# Patient Record
Sex: MALE | Race: BLACK OR AFRICAN AMERICAN | NOT HISPANIC OR LATINO | URBAN - METROPOLITAN AREA
[De-identification: names, ages, dates, MRNs, and addresses within clinical notes are randomized per-mention and may not be internally consistent; named-entity substitution may affect disease eponyms.]

---

## 2021-08-16 ENCOUNTER — OFFICE (OUTPATIENT)
Dept: URBAN - METROPOLITAN AREA CLINIC 19 | Facility: CLINIC | Age: 36
End: 2021-08-16

## 2021-08-16 VITALS
HEIGHT: 69 IN | WEIGHT: 315 LBS | HEART RATE: 73 BPM | OXYGEN SATURATION: 97 % | SYSTOLIC BLOOD PRESSURE: 120 MMHG | DIASTOLIC BLOOD PRESSURE: 73 MMHG

## 2021-08-16 DIAGNOSIS — R19.7 DIARRHEA, UNSPECIFIED: ICD-10-CM

## 2021-08-16 DIAGNOSIS — Z80.0 FAMILY HISTORY OF MALIGNANT NEOPLASM OF DIGESTIVE ORGANS: ICD-10-CM

## 2021-08-16 DIAGNOSIS — K62.5 HEMORRHAGE OF ANUS AND RECTUM: ICD-10-CM

## 2021-08-16 LAB
ALLERGEN PROFILE, BASIC FOOD: CLASS DESCRIPTION: (no result)
ALLERGEN PROFILE, BASIC FOOD: F002-IGE MILK: 0.16 KU/L (ref 0–?)
ALLERGEN PROFILE, BASIC FOOD: F004-IGE WHEAT: 0.43 KU/L
ALLERGEN PROFILE, BASIC FOOD: F008-IGE CORN: 0.56 KU/L
ALLERGEN PROFILE, BASIC FOOD: F013-IGE PEANUT: 1.24 KU/L
ALLERGEN PROFILE, BASIC FOOD: F014-IGE SOYBEAN: 0.48 KU/L
ALLERGEN PROFILE, BASIC FOOD: F026-IGE PORK: <0.1 KU/L
ALLERGEN PROFILE, BASIC FOOD: F027-IGE BEEF: <0.1 KU/L
ALLERGEN PROFILE, BASIC FOOD: F093-IGE CHOCOLATE/CACAO: <0.1 KU/L
ALLERGEN PROFILE, BASIC FOOD: F245-IGE EGG, WHOLE: <0.1 KU/L
ALLERGEN PROFILE, BASIC FOOD: FX02-IGE FOOD MIX (SEAFOODS): NEGATIVE
C-REACTIVE PROTEIN, QUANT: 2 MG/L (ref 0–10)
CBC, PLATELET, NO DIFFERENTIAL: HEMATOCRIT: 41.3 % (ref 37.5–51)
CBC, PLATELET, NO DIFFERENTIAL: HEMOGLOBIN: 13.3 G/DL (ref 13–17.7)
CBC, PLATELET, NO DIFFERENTIAL: MCH: 27.3 PG (ref 26.6–33)
CBC, PLATELET, NO DIFFERENTIAL: MCHC: 32.2 G/DL (ref 31.5–35.7)
CBC, PLATELET, NO DIFFERENTIAL: MCV: 85 FL (ref 79–97)
CBC, PLATELET, NO DIFFERENTIAL: PLATELETS: 315 X10E3/UL (ref 150–450)
CBC, PLATELET, NO DIFFERENTIAL: RBC: 4.88 X10E6/UL (ref 4.14–5.8)
CBC, PLATELET, NO DIFFERENTIAL: RDW: 14.6 % (ref 11.6–15.4)
CBC, PLATELET, NO DIFFERENTIAL: WBC: 8.1 X10E3/UL (ref 3.4–10.8)
COMP. METABOLIC PANEL (14): A/G RATIO: 1.6 (ref 1.2–2.2)
COMP. METABOLIC PANEL (14): ALBUMIN: 4.2 G/DL (ref 4–5)
COMP. METABOLIC PANEL (14): ALKALINE PHOSPHATASE: 87 IU/L (ref 48–121)
COMP. METABOLIC PANEL (14): ALT (SGPT): 18 IU/L (ref 0–44)
COMP. METABOLIC PANEL (14): AST (SGOT): 15 IU/L (ref 0–40)
COMP. METABOLIC PANEL (14): BILIRUBIN, TOTAL: <0.2 MG/DL
COMP. METABOLIC PANEL (14): BUN/CREATININE RATIO: 13 (ref 9–20)
COMP. METABOLIC PANEL (14): BUN: 13 MG/DL (ref 6–20)
COMP. METABOLIC PANEL (14): CALCIUM: 9.3 MG/DL (ref 8.7–10.2)
COMP. METABOLIC PANEL (14): CARBON DIOXIDE, TOTAL: 23 MMOL/L (ref 20–29)
COMP. METABOLIC PANEL (14): CHLORIDE: 105 MMOL/L (ref 96–106)
COMP. METABOLIC PANEL (14): CREATININE: 1 MG/DL (ref 0.76–1.27)
COMP. METABOLIC PANEL (14): EGFR IF AFRICN AM: 113 ML/MIN/1.73 (ref 59–?)
COMP. METABOLIC PANEL (14): EGFR IF NONAFRICN AM: 98 ML/MIN/1.73 (ref 59–?)
COMP. METABOLIC PANEL (14): GLOBULIN, TOTAL: 2.6 G/DL (ref 1.5–4.5)
COMP. METABOLIC PANEL (14): GLUCOSE: 116 MG/DL — HIGH (ref 65–99)
COMP. METABOLIC PANEL (14): POTASSIUM: 4.6 MMOL/L (ref 3.5–5.2)
COMP. METABOLIC PANEL (14): PROTEIN, TOTAL: 6.8 G/DL (ref 6–8.5)
COMP. METABOLIC PANEL (14): SODIUM: 142 MMOL/L (ref 134–144)
IMMUNOGLOBULIN A, QN, SERUM: 448 MG/DL — HIGH (ref 90–386)
SEDIMENTATION RATE-WESTERGREN: 32 MM/HR — HIGH (ref 0–15)
T-TRANSGLUTAMINASE (TTG) IGA: <2 U/ML
TSH: 1.51 UIU/ML (ref 0.45–4.5)

## 2021-08-16 PROCEDURE — 99204 OFFICE O/P NEW MOD 45 MIN: CPT

## 2021-08-16 RX ORDER — SODIUM PICOSULFATE, MAGNESIUM OXIDE, AND ANHYDROUS CITRIC ACID 10; 3.5; 12 MG/160ML; G/160ML; G/160ML
LIQUID ORAL
Qty: 1 | Refills: 0 | Status: COMPLETED
Start: 2021-08-16 | End: 2021-10-13

## 2021-08-16 RX ORDER — DICYCLOMINE HYDROCHLORIDE 10 MG/1
CAPSULE ORAL
Qty: 90 | Refills: 3 | Status: COMPLETED
Start: 2021-08-16 | End: 2021-10-13

## 2021-08-16 NOTE — SERVICEHPINOTES
36-year-old  black male here to discuss chronic postprandial diarrhea.  For the last few years he has had progressive issues with diarrhea, especially after he eats.  Sometimes it will be “watery” stool and sometimes it will be “mushy” stool.  He has occasional rectal bleeding that he attributes to hemorrhoids or “wiping too hard.”  He denies any upper GI issues, including reflux, heartburn, and dysphagia.  He does have some lower abdominal cramping that he attributes to his bowel movements.  It typically resolves after he has a bowel movement.  He takes no GI medications.  He denies any previous GI tests or studies.  Family history is negative for IBD.  His father had colon cancer at age 44.

## 2021-08-16 NOTE — SERVICENOTES
The patient's assessment was reviewed with Dr. Cabrales and a collaborative plan of care was established.

## 2021-10-13 ENCOUNTER — AMBULATORY SURGICAL CENTER (OUTPATIENT)
Dept: URBAN - METROPOLITAN AREA SURGERY 2 | Facility: SURGERY | Age: 36
End: 2021-10-13

## 2021-10-13 ENCOUNTER — OFFICE (OUTPATIENT)
Dept: URBAN - METROPOLITAN AREA PATHOLOGY 22 | Facility: PATHOLOGY | Age: 36
End: 2021-10-13

## 2021-10-13 VITALS
SYSTOLIC BLOOD PRESSURE: 122 MMHG | DIASTOLIC BLOOD PRESSURE: 95 MMHG | HEART RATE: 72 BPM | RESPIRATION RATE: 22 BRPM | RESPIRATION RATE: 18 BRPM | HEART RATE: 83 BPM | OXYGEN SATURATION: 93 % | WEIGHT: 315 LBS | TEMPERATURE: 97.3 F | HEART RATE: 69 BPM | DIASTOLIC BLOOD PRESSURE: 75 MMHG | OXYGEN SATURATION: 97 % | SYSTOLIC BLOOD PRESSURE: 119 MMHG | RESPIRATION RATE: 18 BRPM | RESPIRATION RATE: 22 BRPM | DIASTOLIC BLOOD PRESSURE: 56 MMHG | WEIGHT: 315 LBS | HEART RATE: 83 BPM | DIASTOLIC BLOOD PRESSURE: 78 MMHG | SYSTOLIC BLOOD PRESSURE: 119 MMHG | DIASTOLIC BLOOD PRESSURE: 95 MMHG | HEIGHT: 69 IN | TEMPERATURE: 97.3 F | SYSTOLIC BLOOD PRESSURE: 156 MMHG | HEART RATE: 82 BPM | HEART RATE: 83 BPM | DIASTOLIC BLOOD PRESSURE: 78 MMHG | OXYGEN SATURATION: 94 % | RESPIRATION RATE: 20 BRPM | DIASTOLIC BLOOD PRESSURE: 75 MMHG | SYSTOLIC BLOOD PRESSURE: 140 MMHG | DIASTOLIC BLOOD PRESSURE: 56 MMHG | DIASTOLIC BLOOD PRESSURE: 95 MMHG | DIASTOLIC BLOOD PRESSURE: 56 MMHG | RESPIRATION RATE: 20 BRPM | OXYGEN SATURATION: 94 % | SYSTOLIC BLOOD PRESSURE: 156 MMHG | WEIGHT: 315 LBS | HEART RATE: 69 BPM | HEART RATE: 71 BPM | DIASTOLIC BLOOD PRESSURE: 68 MMHG | TEMPERATURE: 98.5 F | OXYGEN SATURATION: 92 % | OXYGEN SATURATION: 92 % | SYSTOLIC BLOOD PRESSURE: 119 MMHG | RESPIRATION RATE: 18 BRPM | HEART RATE: 82 BPM | DIASTOLIC BLOOD PRESSURE: 68 MMHG | OXYGEN SATURATION: 97 % | DIASTOLIC BLOOD PRESSURE: 75 MMHG | HEIGHT: 69 IN | OXYGEN SATURATION: 93 % | DIASTOLIC BLOOD PRESSURE: 68 MMHG | HEART RATE: 69 BPM | OXYGEN SATURATION: 92 % | HEART RATE: 71 BPM | OXYGEN SATURATION: 97 % | TEMPERATURE: 97.3 F | HEART RATE: 72 BPM | OXYGEN SATURATION: 94 % | HEART RATE: 71 BPM | DIASTOLIC BLOOD PRESSURE: 78 MMHG | SYSTOLIC BLOOD PRESSURE: 133 MMHG | SYSTOLIC BLOOD PRESSURE: 133 MMHG | SYSTOLIC BLOOD PRESSURE: 140 MMHG | SYSTOLIC BLOOD PRESSURE: 122 MMHG | SYSTOLIC BLOOD PRESSURE: 133 MMHG | RESPIRATION RATE: 20 BRPM | RESPIRATION RATE: 22 BRPM | SYSTOLIC BLOOD PRESSURE: 156 MMHG | HEIGHT: 69 IN | TEMPERATURE: 98.5 F | HEART RATE: 82 BPM | TEMPERATURE: 98.5 F | HEART RATE: 72 BPM | SYSTOLIC BLOOD PRESSURE: 140 MMHG | SYSTOLIC BLOOD PRESSURE: 122 MMHG | OXYGEN SATURATION: 93 %

## 2021-10-13 DIAGNOSIS — K31.89 OTHER DISEASES OF STOMACH AND DUODENUM: ICD-10-CM

## 2021-10-13 DIAGNOSIS — R19.7 DIARRHEA, UNSPECIFIED: ICD-10-CM

## 2021-10-13 DIAGNOSIS — Z80.0 FAMILY HISTORY OF MALIGNANT NEOPLASM OF DIGESTIVE ORGANS: ICD-10-CM

## 2021-10-13 DIAGNOSIS — K62.5 HEMORRHAGE OF ANUS AND RECTUM: ICD-10-CM

## 2021-10-13 DIAGNOSIS — R10.9 UNSPECIFIED ABDOMINAL PAIN: ICD-10-CM

## 2021-10-13 PROCEDURE — 88305 TISSUE EXAM BY PATHOLOGIST: CPT | Performed by: INTERNAL MEDICINE

## 2021-10-13 PROCEDURE — 43239 EGD BIOPSY SINGLE/MULTIPLE: CPT | Mod: 51 | Performed by: INTERNAL MEDICINE

## 2021-10-13 PROCEDURE — 45380 COLONOSCOPY AND BIOPSY: CPT | Performed by: INTERNAL MEDICINE

## 2021-10-13 PROCEDURE — 88342 IMHCHEM/IMCYTCHM 1ST ANTB: CPT | Performed by: INTERNAL MEDICINE
